# Patient Record
Sex: MALE | Race: WHITE | NOT HISPANIC OR LATINO | URBAN - METROPOLITAN AREA
[De-identification: names, ages, dates, MRNs, and addresses within clinical notes are randomized per-mention and may not be internally consistent; named-entity substitution may affect disease eponyms.]

---
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
## 2023-06-09 NOTE — ED PROCEDURE NOTE - CPROC ED POST PROC CARE GUIDE1
Verbal/written post procedure instructions were given to patient/caregiver./Instructed patient/caregiver to follow-up with primary care physician./Instructed patient/caregiver regarding signs and symptoms of infection./Keep the cast/splint/dressing clean and dry.
minutes on the discharge service.

## 2023-07-26 ENCOUNTER — APPOINTMENT (OUTPATIENT)
Dept: TRANSGENDER CARE | Facility: CLINIC | Age: 18
End: 2023-07-26
Payer: COMMERCIAL

## 2023-07-26 ENCOUNTER — APPOINTMENT (OUTPATIENT)
Dept: TRANSGENDER CARE | Facility: CLINIC | Age: 18
End: 2023-07-26

## 2023-07-26 VITALS
SYSTOLIC BLOOD PRESSURE: 118 MMHG | WEIGHT: 215 LBS | HEIGHT: 69.5 IN | BODY MASS INDEX: 31.13 KG/M2 | TEMPERATURE: 98.3 F | DIASTOLIC BLOOD PRESSURE: 70 MMHG | OXYGEN SATURATION: 99 % | HEART RATE: 66 BPM

## 2023-07-26 DIAGNOSIS — Z80.1 FAMILY HISTORY OF MALIGNANT NEOPLASM OF TRACHEA, BRONCHUS AND LUNG: ICD-10-CM

## 2023-07-26 PROCEDURE — 36415 COLL VENOUS BLD VENIPUNCTURE: CPT

## 2023-07-26 PROCEDURE — 99204 OFFICE O/P NEW MOD 45 MIN: CPT | Mod: 25

## 2023-07-26 NOTE — PLAN
[FreeTextEntry1] : \par Gender Dysphoria: Patient will be added to MH clearance wait list and provided with referrals for therapy in regards to clearance. Will order routine blood work and f/u results to patient once made available. GAHT to be started pending normal labs and MH clearance letter. Patient was provided with resources/referrals at time of visit. Advised patient to call with any questions or concerns. Patient verbalized understanding.

## 2023-07-26 NOTE — HISTORY OF PRESENT ILLNESS
[FreeTextEntry1] : establish care [de-identified] : BRIONNA CUTLER is a 17 year old, transfemale seen on 07/26/2023 for initial transgender care program intake visit.\par \par Preferred Pronouns: she/her\par Gender identity: female\par Assigned []male at birth\par Specific Terms for Body Parts: medical terms okay\par Call pt: Brionna\par \par Reason for Appointment: Brionna is a 17 year old AMAB woman (she/her) interested in GAHT. Patient has PCP, not looking to establish primary care.\par \par COVID Screening: 3 COVID shots. No interest in MPX vax.\par \par Presenting Problems or Unmet Needs: Reports no medical concerns or diagnoses. No allergies.\par \par “Goals”: Establish GAHT; obtain MH referrals for clearance; \par \par Transition HX + Present Life: She relays that she saw a trans person in a [employer] ad in the 10th Grade and realized that she is trans. She came out to friends in the 11th Grade and they are very supportive. She told her mother a month later and she is very supportive. She relays that her father seems indifferent. Lives with her mother, father, and 3 siblings and feels safe at home. Food secure and reliable transportation. Patient reports she has socially transitioned for 4 years now, wants to start gaht. Patient reports good support from family and friends. Patient looking into FFS as well. Patient reports she is most interested in starting estradiol and spironolactone pills.\par \par Education | Employment: She works at [employer] 4 days a week and is out; people are supportive. She is majoring in Mechanical Engineering at [employer] doesn’t really talk to many people there but feels safe. Patient reports going into [employer] year, likes it a lot of physics and math. \par \par Mental Health: She reports no MH concerns or diagnoses. Doesn’t take meds or see a therapist. Never been inpatient. Reports no sleeping concerns. No family psych hx. She denies any thoughts of hurting self or anyone else. \par \par Endocrinology, Primary Care, GYN: She has never been on GAHT and would like to start. Never had hormones checked. No known personal or family hx of endo disorders. She plans to continue primary care with her current provider, Dr. Vj España. Patient reports last CPE was over a year ago, patient is looking for just gaht \par \par Coping: She likes to play videogames and take naps. She turns to friends for support.\par \par Feelings of Gender Dysphoria/ Body Dysmorphia: She feels dysphoric about her shoulders and face.\par \par Tattoos/ Piercing: Prof done.\par \par Cigarette, Vaping, Marijuana, Alcohol, and Drug Use: She reports no substance use.\par \par Reproductive Endocrinology: No interest. No interest in biological children\par \par Gender Affirming Surgery: Might be interested in the future.\par \par Name Change + Gender Marker: Interested in gender marker change; [place].\par \par Nutrition: She reports no nutritional concerns. 5’9, 210 lbs. 2-3 meals. She ice skates and swims on weekends.\par \par Sexual Health: She is not in a relationship and has never been sexually active. Reports no STI/HIV concerns. Declines any STI testing \par \par Goals of Trans care:\par \par 1) start GAHT\par 2) resources/referrals\par 3)\par \par She reports she has been relatively healthy over the years. She reports several years ago they thought she might have pseudotumor cerebri but turned out to be optic disc swelling as per patient. No further workup or treatment was needed, no issues since then. Pt denies any SOB, cough, chest pain, palpitations, N/V/D/C, fever, or chills. No other health concerns today.

## 2023-07-26 NOTE — HEALTH RISK ASSESSMENT
[1 or 2 (0 pts)] : 1 or 2 (0 points) [Never (0 pts)] : Never (0 points) [No] : In the past 12 months have you used drugs other than those required for medical reasons? No [0] : 2) Feeling down, depressed, or hopeless: Not at all (0) [PHQ-2 Negative - No further assessment needed] : PHQ-2 Negative - No further assessment needed [HIV test declined] : HIV test declined [Hepatitis C test declined] : Hepatitis C test declined [With Family] : lives with family [Employed] : employed [Student] : student [College] : College [Single] : single [Feels Safe at Home] : Feels safe at home [Fully functional (bathing, dressing, toileting, transferring, walking, feeding)] : Fully functional (bathing, dressing, toileting, transferring, walking, feeding) [Fully functional (using the telephone, shopping, preparing meals, housekeeping, doing laundry, using] : Fully functional and needs no help or supervision to perform IADLs (using the telephone, shopping, preparing meals, housekeeping, doing laundry, using transportation, managing medications and managing finances) [Reports normal functional visual acuity (ie: able to read med bottle)] : Reports normal functional visual acuity [Never] : Never [Audit-CScore] : 0 [de-identified] : ice skates, swims [de-identified] : regular [[id number]] : 0 [Sexually Active] : not sexually active [High Risk Behavior] : no high risk behavior [Reports changes in hearing] : Reports no changes in hearing [Reports changes in vision] : Reports no changes in vision [Reports changes in dental health] : Reports no changes in dental health [FreeTextEntry2] : working at [employer] [de-identified] : studying mechanical engineering at [employer]

## 2023-07-28 ENCOUNTER — NON-APPOINTMENT (OUTPATIENT)
Age: 18
End: 2023-07-28

## 2023-07-28 LAB
ALBUMIN SERPL ELPH-MCNC: 4.9 G/DL
ALP BLD-CCNC: 83 U/L
ALT SERPL-CCNC: 37 U/L
ANION GAP SERPL CALC-SCNC: 11 MMOL/L
AST SERPL-CCNC: 21 U/L
BILIRUB SERPL-MCNC: 0.4 MG/DL
BUN SERPL-MCNC: 14 MG/DL
CALCIUM SERPL-MCNC: 9.5 MG/DL
CHLORIDE SERPL-SCNC: 106 MMOL/L
CHOLEST SERPL-MCNC: 190 MG/DL
CO2 SERPL-SCNC: 24 MMOL/L
CREAT SERPL-MCNC: 0.76 MG/DL
EGFR: 133 ML/MIN/1.73M2
ESTIMATED AVERAGE GLUCOSE: 94 MG/DL
ESTRADIOL SERPL-MCNC: 35 PG/ML
GLUCOSE SERPL-MCNC: 85 MG/DL
HBA1C MFR BLD HPLC: 4.9 %
HDLC SERPL-MCNC: 38 MG/DL
LDLC SERPL CALC-MCNC: 128 MG/DL
NONHDLC SERPL-MCNC: 152 MG/DL
POTASSIUM SERPL-SCNC: 4.8 MMOL/L
PROT SERPL-MCNC: 7.1 G/DL
SODIUM SERPL-SCNC: 141 MMOL/L
TESTOST SERPL-MCNC: 481 NG/DL
TRIGL SERPL-MCNC: 132 MG/DL
TSH SERPL-ACNC: 1.56 UIU/ML

## 2023-09-21 ENCOUNTER — APPOINTMENT (OUTPATIENT)
Dept: TRANSGENDER CARE | Facility: CLINIC | Age: 18
End: 2023-09-21
Payer: COMMERCIAL

## 2023-09-21 PROCEDURE — 90832 PSYTX W PT 30 MINUTES: CPT | Mod: 95

## 2023-09-28 ENCOUNTER — APPOINTMENT (OUTPATIENT)
Dept: TRANSGENDER CARE | Facility: CLINIC | Age: 18
End: 2023-09-28
Payer: COMMERCIAL

## 2023-09-28 ENCOUNTER — TRANSCRIPTION ENCOUNTER (OUTPATIENT)
Age: 18
End: 2023-09-28

## 2023-09-28 PROCEDURE — 99213 OFFICE O/P EST LOW 20 MIN: CPT | Mod: 95

## 2023-10-02 ENCOUNTER — NON-APPOINTMENT (OUTPATIENT)
Age: 18
End: 2023-10-02

## 2023-11-13 ENCOUNTER — NON-APPOINTMENT (OUTPATIENT)
Age: 18
End: 2023-11-13

## 2023-12-21 ENCOUNTER — APPOINTMENT (OUTPATIENT)
Dept: TRANSGENDER CARE | Facility: CLINIC | Age: 18
End: 2023-12-21
Payer: COMMERCIAL

## 2023-12-21 VITALS
SYSTOLIC BLOOD PRESSURE: 102 MMHG | TEMPERATURE: 97.9 F | OXYGEN SATURATION: 98 % | BODY MASS INDEX: 30.55 KG/M2 | WEIGHT: 211 LBS | DIASTOLIC BLOOD PRESSURE: 68 MMHG | HEART RATE: 77 BPM | HEIGHT: 69.5 IN

## 2023-12-21 DIAGNOSIS — Z23 ENCOUNTER FOR IMMUNIZATION: ICD-10-CM

## 2023-12-21 PROCEDURE — 99213 OFFICE O/P EST LOW 20 MIN: CPT | Mod: 25

## 2023-12-21 PROCEDURE — 36415 COLL VENOUS BLD VENIPUNCTURE: CPT

## 2023-12-21 NOTE — HISTORY OF PRESENT ILLNESS
[FreeTextEntry1] : f/u [de-identified] : Patient here today for gaht f/u.  She reports feeling good on gaht, softer skin, some breast growth, more emotional and some increased urinary frequency. She denies any side effects, just some nipple sensitivity. She reports okay so far on dosing, she has enough pills through tuesday.  Social Hx: She works at [employer] 4 days a week. 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 Patient reports in [employer] year, likes it a lot of physics and math. She is officially on winter break, going back mid jan. She is working a lot during break   Patient denies any headache visual changes, dizziness, chest pain, SOB, palpitations, wheezing, cough, abdominal pain, nausea, vomiting, diarrhea, joint pain, leg swelling or leg pain. No other health concerns today.

## 2023-12-21 NOTE — PLAN
[FreeTextEntry1] :  Gender Dysphoria: stable. Will order routine blood work and f/u results to patient once made available - will send [facility] message with results and dose adjustment if needed. F/u 3 months. Advised patient to call with any questions or concerns. Patient verbalized understanding.

## 2023-12-22 ENCOUNTER — TRANSCRIPTION ENCOUNTER (OUTPATIENT)
Age: 18
End: 2023-12-22

## 2023-12-22 LAB
ALBUMIN SERPL ELPH-MCNC: 4.6 G/DL
ALP BLD-CCNC: 75 U/L
ALT SERPL-CCNC: 28 U/L
ANION GAP SERPL CALC-SCNC: 13 MMOL/L
AST SERPL-CCNC: 16 U/L
BASOPHILS # BLD AUTO: 0.03 K/UL
BASOPHILS NFR BLD AUTO: 0.4 %
BILIRUB SERPL-MCNC: 0.5 MG/DL
BUN SERPL-MCNC: 11 MG/DL
CALCIUM SERPL-MCNC: 9.5 MG/DL
CHLORIDE SERPL-SCNC: 103 MMOL/L
CO2 SERPL-SCNC: 21 MMOL/L
CREAT SERPL-MCNC: 0.63 MG/DL
EGFR: 140 ML/MIN/1.73M2
EOSINOPHIL # BLD AUTO: 0.14 K/UL
EOSINOPHIL NFR BLD AUTO: 2 %
ESTRADIOL SERPL-MCNC: 85 PG/ML
GLUCOSE SERPL-MCNC: 74 MG/DL
HCT VFR BLD CALC: 46.5 %
HGB BLD-MCNC: 15.7 G/DL
IMM GRANULOCYTES NFR BLD AUTO: 0.3 %
LYMPHOCYTES # BLD AUTO: 2.01 K/UL
LYMPHOCYTES NFR BLD AUTO: 28.2 %
MAN DIFF?: NORMAL
MCHC RBC-ENTMCNC: 30.7 PG
MCHC RBC-ENTMCNC: 33.8 GM/DL
MCV RBC AUTO: 91 FL
MONOCYTES # BLD AUTO: 0.46 K/UL
MONOCYTES NFR BLD AUTO: 6.5 %
NEUTROPHILS # BLD AUTO: 4.47 K/UL
NEUTROPHILS NFR BLD AUTO: 62.6 %
PLATELET # BLD AUTO: 288 K/UL
POTASSIUM SERPL-SCNC: 4.7 MMOL/L
PROT SERPL-MCNC: 7.1 G/DL
RBC # BLD: 5.11 M/UL
RBC # FLD: 12.2 %
SODIUM SERPL-SCNC: 137 MMOL/L
TESTOST SERPL-MCNC: 227 NG/DL
WBC # FLD AUTO: 7.13 K/UL

## 2024-02-17 ENCOUNTER — RX RENEWAL (OUTPATIENT)
Age: 19
End: 2024-02-17

## 2024-03-21 ENCOUNTER — APPOINTMENT (OUTPATIENT)
Dept: TRANSGENDER CARE | Facility: CLINIC | Age: 19
End: 2024-03-21
Payer: COMMERCIAL

## 2024-03-21 VITALS
DIASTOLIC BLOOD PRESSURE: 68 MMHG | BODY MASS INDEX: 30.81 KG/M2 | OXYGEN SATURATION: 98 % | TEMPERATURE: 97.8 F | HEART RATE: 64 BPM | HEIGHT: 69 IN | SYSTOLIC BLOOD PRESSURE: 108 MMHG | WEIGHT: 208 LBS

## 2024-03-21 DIAGNOSIS — F64.9 GENDER IDENTITY DISORDER, UNSPECIFIED: ICD-10-CM

## 2024-03-21 DIAGNOSIS — Z00.00 ENCOUNTER FOR GENERAL ADULT MEDICAL EXAMINATION W/OUT ABNORMAL FINDINGS: ICD-10-CM

## 2024-03-21 PROCEDURE — 36415 COLL VENOUS BLD VENIPUNCTURE: CPT

## 2024-03-21 PROCEDURE — G2211 COMPLEX E/M VISIT ADD ON: CPT

## 2024-03-21 PROCEDURE — 99213 OFFICE O/P EST LOW 20 MIN: CPT

## 2024-03-21 NOTE — PLAN
[FreeTextEntry1] :  Gender Dysphoria: stable. Will order routine blood work and f/u results to patient once made available - will send [facility] message with results and dose adjustment if needed. F/u 3 months. Advised patient to call with any questions or concerns. Patient verbalized understanding.

## 2024-03-21 NOTE — HISTORY OF PRESENT ILLNESS
[FreeTextEntry1] : f/u [de-identified] : Patient here today for gaht f/u.  Gender Dysphoria: Patient started gaht 9/2023. She reports feeling good on gaht, she reports more of the same changes just progressing along. She feels good on current regimen  Social Hx: She works at [employer] 4 days a week. 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 Patient had spring break last week, she has 7 more weeks until the semester ends. No big plans for the summer  Patient denies any headache visual changes, dizziness, chest pain, SOB, palpitations, wheezing, cough, abdominal pain, nausea, vomiting, diarrhea, joint pain, leg swelling or leg pain. No other health concerns today.

## 2024-03-22 ENCOUNTER — TRANSCRIPTION ENCOUNTER (OUTPATIENT)
Age: 19
End: 2024-03-22

## 2024-03-22 DIAGNOSIS — E55.9 VITAMIN D DEFICIENCY, UNSPECIFIED: ICD-10-CM

## 2024-03-22 LAB
25(OH)D3 SERPL-MCNC: 12.9 NG/ML
ALBUMIN SERPL ELPH-MCNC: 4.5 G/DL
ALP BLD-CCNC: 62 U/L
ALT SERPL-CCNC: 24 U/L
ANION GAP SERPL CALC-SCNC: 10 MMOL/L
AST SERPL-CCNC: 16 U/L
BASOPHILS # BLD AUTO: 0.03 K/UL
BASOPHILS NFR BLD AUTO: 0.5 %
BILIRUB SERPL-MCNC: 0.4 MG/DL
BUN SERPL-MCNC: 12 MG/DL
CALCIUM SERPL-MCNC: 9.7 MG/DL
CHLORIDE SERPL-SCNC: 104 MMOL/L
CHOLEST SERPL-MCNC: 165 MG/DL
CO2 SERPL-SCNC: 24 MMOL/L
CREAT SERPL-MCNC: 0.7 MG/DL
EGFR: 135 ML/MIN/1.73M2
EOSINOPHIL # BLD AUTO: 0.09 K/UL
EOSINOPHIL NFR BLD AUTO: 1.4 %
ESTIMATED AVERAGE GLUCOSE: 91 MG/DL
ESTRADIOL SERPL-MCNC: 79 PG/ML
GLUCOSE SERPL-MCNC: 82 MG/DL
HBA1C MFR BLD HPLC: 4.8 %
HCT VFR BLD CALC: 45.1 %
HDLC SERPL-MCNC: 49 MG/DL
HGB BLD-MCNC: 14.9 G/DL
IMM GRANULOCYTES NFR BLD AUTO: 0.2 %
LDLC SERPL CALC-MCNC: 101 MG/DL
LYMPHOCYTES # BLD AUTO: 1.72 K/UL
LYMPHOCYTES NFR BLD AUTO: 25.9 %
MAN DIFF?: NORMAL
MCHC RBC-ENTMCNC: 30.5 PG
MCHC RBC-ENTMCNC: 33 GM/DL
MCV RBC AUTO: 92.2 FL
MONOCYTES # BLD AUTO: 0.39 K/UL
MONOCYTES NFR BLD AUTO: 5.9 %
NEUTROPHILS # BLD AUTO: 4.41 K/UL
NEUTROPHILS NFR BLD AUTO: 66.1 %
NONHDLC SERPL-MCNC: 116 MG/DL
PLATELET # BLD AUTO: 310 K/UL
POTASSIUM SERPL-SCNC: 4.8 MMOL/L
PROT SERPL-MCNC: 6.9 G/DL
RBC # BLD: 4.89 M/UL
RBC # FLD: 11.9 %
SODIUM SERPL-SCNC: 137 MMOL/L
TESTOST SERPL-MCNC: 158 NG/DL
TRIGL SERPL-MCNC: 78 MG/DL
TSH SERPL-ACNC: 1.46 UIU/ML
WBC # FLD AUTO: 6.65 K/UL

## 2024-03-27 RX ORDER — SPIRONOLACTONE 100 MG/1
100 TABLET ORAL
Qty: 180 | Refills: 0 | Status: ACTIVE | COMMUNITY
Start: 2023-09-28 | End: 1900-01-01

## 2024-05-10 ENCOUNTER — RX RENEWAL (OUTPATIENT)
Age: 19
End: 2024-05-10

## 2024-05-10 RX ORDER — ERGOCALCIFEROL 1.25 MG/1
1.25 MG CAPSULE, LIQUID FILLED ORAL
Qty: 8 | Refills: 0 | Status: ACTIVE | COMMUNITY
Start: 2024-03-22 | End: 1900-01-01

## 2024-05-16 ENCOUNTER — RX RENEWAL (OUTPATIENT)
Age: 19
End: 2024-05-16

## 2024-05-16 RX ORDER — ESTRADIOL 2 MG/1
2 TABLET ORAL
Qty: 180 | Refills: 0 | Status: ACTIVE | COMMUNITY
Start: 2023-09-28 | End: 1900-01-01

## 2024-06-20 ENCOUNTER — APPOINTMENT (OUTPATIENT)
Dept: TRANSGENDER CARE | Facility: CLINIC | Age: 19
End: 2024-06-20

## 2024-06-24 ENCOUNTER — APPOINTMENT (OUTPATIENT)
Dept: TRANSGENDER CARE | Facility: CLINIC | Age: 19
End: 2024-06-24

## 2024-07-03 ENCOUNTER — APPOINTMENT (OUTPATIENT)
Dept: TRANSGENDER CARE | Facility: CLINIC | Age: 19
End: 2024-07-03
Payer: COMMERCIAL

## 2024-07-03 VITALS
SYSTOLIC BLOOD PRESSURE: 106 MMHG | TEMPERATURE: 98.5 F | WEIGHT: 204 LBS | HEART RATE: 76 BPM | OXYGEN SATURATION: 98 % | BODY MASS INDEX: 30.21 KG/M2 | DIASTOLIC BLOOD PRESSURE: 68 MMHG | HEIGHT: 69 IN

## 2024-07-03 DIAGNOSIS — F64.9 GENDER IDENTITY DISORDER, UNSPECIFIED: ICD-10-CM

## 2024-07-03 PROCEDURE — G2211 COMPLEX E/M VISIT ADD ON: CPT

## 2024-07-03 PROCEDURE — 99213 OFFICE O/P EST LOW 20 MIN: CPT

## 2024-07-03 PROCEDURE — 36415 COLL VENOUS BLD VENIPUNCTURE: CPT

## 2024-07-04 ENCOUNTER — TRANSCRIPTION ENCOUNTER (OUTPATIENT)
Age: 19
End: 2024-07-04

## 2024-07-04 LAB
25(OH)D3 SERPL-MCNC: 30 NG/ML
ALBUMIN SERPL ELPH-MCNC: 4.6 G/DL
ALP BLD-CCNC: 50 U/L
ALT SERPL-CCNC: 22 U/L
ANION GAP SERPL CALC-SCNC: 11 MMOL/L
AST SERPL-CCNC: 17 U/L
BASOPHILS # BLD AUTO: 0.04 K/UL
BASOPHILS NFR BLD AUTO: 0.4 %
BILIRUB SERPL-MCNC: 0.4 MG/DL
BUN SERPL-MCNC: 9 MG/DL
CALCIUM SERPL-MCNC: 9.5 MG/DL
CHLORIDE SERPL-SCNC: 105 MMOL/L
CO2 SERPL-SCNC: 22 MMOL/L
CREAT SERPL-MCNC: 0.65 MG/DL
EGFR: 138 ML/MIN/1.73M2
EOSINOPHIL # BLD AUTO: 0.12 K/UL
EOSINOPHIL NFR BLD AUTO: 1.3 %
ESTRADIOL SERPL-MCNC: 113 PG/ML
GLUCOSE SERPL-MCNC: 86 MG/DL
HCT VFR BLD CALC: 43.7 %
HGB BLD-MCNC: 14.5 G/DL
IMM GRANULOCYTES NFR BLD AUTO: 0.2 %
LYMPHOCYTES # BLD AUTO: 2.44 K/UL
LYMPHOCYTES NFR BLD AUTO: 26 %
MAN DIFF?: NORMAL
MCHC RBC-ENTMCNC: 30.2 PG
MCHC RBC-ENTMCNC: 33.2 GM/DL
MCV RBC AUTO: 91 FL
MONOCYTES # BLD AUTO: 0.56 K/UL
MONOCYTES NFR BLD AUTO: 6 %
NEUTROPHILS # BLD AUTO: 6.2 K/UL
NEUTROPHILS NFR BLD AUTO: 66.1 %
PLATELET # BLD AUTO: 332 K/UL
POTASSIUM SERPL-SCNC: 4.3 MMOL/L
PROT SERPL-MCNC: 6.9 G/DL
RBC # BLD: 4.8 M/UL
RBC # FLD: 12.3 %
SODIUM SERPL-SCNC: 139 MMOL/L
TESTOST SERPL-MCNC: 96.6 NG/DL
WBC # FLD AUTO: 9.38 K/UL

## 2024-07-05 ENCOUNTER — TRANSCRIPTION ENCOUNTER (OUTPATIENT)
Age: 19
End: 2024-07-05

## 2024-07-05 RX ORDER — SPIRONOLACTONE 50 MG/1
50 TABLET ORAL DAILY
Qty: 90 | Refills: 1 | Status: ACTIVE | COMMUNITY
Start: 2024-07-05 | End: 1900-01-01

## 2024-08-05 ENCOUNTER — NON-APPOINTMENT (OUTPATIENT)
Age: 19
End: 2024-08-05

## 2024-08-13 ENCOUNTER — APPOINTMENT (OUTPATIENT)
Dept: DERMATOLOGY | Facility: CLINIC | Age: 19
End: 2024-08-13
Payer: COMMERCIAL

## 2024-08-13 DIAGNOSIS — L02.415 CUTANEOUS ABSCESS OF RIGHT LOWER LIMB: ICD-10-CM

## 2024-08-13 PROCEDURE — 99214 OFFICE O/P EST MOD 30 MIN: CPT

## 2024-08-13 RX ORDER — CEFADROXIL 500 MG/1
500 CAPSULE ORAL TWICE DAILY
Qty: 20 | Refills: 0 | Status: ACTIVE | COMMUNITY
Start: 2024-08-13 | End: 1900-01-01

## 2024-08-13 RX ORDER — MUPIROCIN 20 MG/G
2 OINTMENT TOPICAL
Qty: 1 | Refills: 3 | Status: ACTIVE | COMMUNITY
Start: 2024-08-13 | End: 1900-01-01

## 2024-08-13 NOTE — ASSESSMENT
[FreeTextEntry1] : Ingrown toenail with probable superinfection   Therapeutic options and their risks and benefits; along with multiple diagnostic possibilities were discussed at length; risks and benefits of further study were discussed;  duricef 500 BID x 10d mupirocin ointment BID  recommend consult with Dr. Herbert- podiatry for definitive treatment;  check portal 3d for culture results

## 2024-08-13 NOTE — PHYSICAL EXAM
[FreeTextEntry3] : Right great toe: + ingrowth of lateral nailfolds, with hypertrophy, erythema, tender, with purulent drainage b/l

## 2024-08-13 NOTE — HISTORY OF PRESENT ILLNESS
[de-identified] : The patient has been fit in for urgent appointment.  Pt. c/o problems with right great toenail;  has been painful, persistent, has been treated for ingrowth in the past

## 2024-08-20 ENCOUNTER — TRANSCRIPTION ENCOUNTER (OUTPATIENT)
Age: 19
End: 2024-08-20

## 2024-08-20 LAB — BACTERIA SPEC CULT: ABNORMAL

## 2024-09-27 ENCOUNTER — RX RENEWAL (OUTPATIENT)
Age: 19
End: 2024-09-27

## 2024-10-14 ENCOUNTER — NON-APPOINTMENT (OUTPATIENT)
Age: 19
End: 2024-10-14

## 2024-10-21 ENCOUNTER — NON-APPOINTMENT (OUTPATIENT)
Age: 19
End: 2024-10-21

## 2024-10-21 ENCOUNTER — APPOINTMENT (OUTPATIENT)
Dept: TRANSGENDER CARE | Facility: CLINIC | Age: 19
End: 2024-10-21
Payer: COMMERCIAL

## 2024-10-21 VITALS
HEART RATE: 74 BPM | SYSTOLIC BLOOD PRESSURE: 114 MMHG | HEIGHT: 69 IN | WEIGHT: 224 LBS | OXYGEN SATURATION: 98 % | DIASTOLIC BLOOD PRESSURE: 70 MMHG | TEMPERATURE: 98.3 F | BODY MASS INDEX: 33.18 KG/M2

## 2024-10-21 DIAGNOSIS — F64.9 GENDER IDENTITY DISORDER, UNSPECIFIED: ICD-10-CM

## 2024-10-21 DIAGNOSIS — Z23 ENCOUNTER FOR IMMUNIZATION: ICD-10-CM

## 2024-10-21 DIAGNOSIS — F90.9 ATTENTION-DEFICIT HYPERACTIVITY DISORDER, UNSPECIFIED TYPE: ICD-10-CM

## 2024-10-21 PROCEDURE — 93000 ELECTROCARDIOGRAM COMPLETE: CPT

## 2024-10-21 PROCEDURE — G0008: CPT

## 2024-10-21 PROCEDURE — 90656 IIV3 VACC NO PRSV 0.5 ML IM: CPT

## 2024-10-22 ENCOUNTER — TRANSCRIPTION ENCOUNTER (OUTPATIENT)
Age: 19
End: 2024-10-22

## 2024-10-22 LAB
ALBUMIN SERPL ELPH-MCNC: 4.4 G/DL
ALP BLD-CCNC: 51 U/L
ALT SERPL-CCNC: 19 U/L
ANION GAP SERPL CALC-SCNC: 13 MMOL/L
AST SERPL-CCNC: 16 U/L
BASOPHILS # BLD AUTO: 0.06 K/UL
BASOPHILS NFR BLD AUTO: 0.5 %
BILIRUB SERPL-MCNC: 0.2 MG/DL
BUN SERPL-MCNC: 13 MG/DL
CALCIUM SERPL-MCNC: 9.7 MG/DL
CHLORIDE SERPL-SCNC: 103 MMOL/L
CO2 SERPL-SCNC: 23 MMOL/L
CREAT SERPL-MCNC: 0.69 MG/DL
EGFR: 135 ML/MIN/1.73M2
EOSINOPHIL # BLD AUTO: 0.38 K/UL
EOSINOPHIL NFR BLD AUTO: 3.3 %
ESTRADIOL SERPL-MCNC: 133 PG/ML
GLUCOSE SERPL-MCNC: 73 MG/DL
HCT VFR BLD CALC: 42.8 %
HGB BLD-MCNC: 14.7 G/DL
IMM GRANULOCYTES NFR BLD AUTO: 0.3 %
LYMPHOCYTES # BLD AUTO: 2.69 K/UL
LYMPHOCYTES NFR BLD AUTO: 23.6 %
MAN DIFF?: NORMAL
MCHC RBC-ENTMCNC: 31.3 PG
MCHC RBC-ENTMCNC: 34.3 GM/DL
MCV RBC AUTO: 91.1 FL
MONOCYTES # BLD AUTO: 0.91 K/UL
MONOCYTES NFR BLD AUTO: 8 %
NEUTROPHILS # BLD AUTO: 7.33 K/UL
NEUTROPHILS NFR BLD AUTO: 64.3 %
PLATELET # BLD AUTO: 330 K/UL
POTASSIUM SERPL-SCNC: 5.1 MMOL/L
PROT SERPL-MCNC: 6.9 G/DL
RBC # BLD: 4.7 M/UL
RBC # FLD: 11.8 %
SODIUM SERPL-SCNC: 138 MMOL/L
TESTOST SERPL-MCNC: 3.8 NG/DL
WBC # FLD AUTO: 11.4 K/UL

## 2025-01-03 ENCOUNTER — RX RENEWAL (OUTPATIENT)
Age: 20
End: 2025-01-03

## 2025-04-21 ENCOUNTER — APPOINTMENT (OUTPATIENT)
Dept: TRANSGENDER CARE | Facility: CLINIC | Age: 20
End: 2025-04-21
Payer: COMMERCIAL

## 2025-04-21 VITALS
WEIGHT: 240 LBS | BODY MASS INDEX: 35.55 KG/M2 | OXYGEN SATURATION: 98 % | HEART RATE: 74 BPM | SYSTOLIC BLOOD PRESSURE: 110 MMHG | HEIGHT: 69 IN | DIASTOLIC BLOOD PRESSURE: 72 MMHG | TEMPERATURE: 98.1 F

## 2025-04-21 DIAGNOSIS — F64.9 GENDER IDENTITY DISORDER, UNSPECIFIED: ICD-10-CM

## 2025-04-21 DIAGNOSIS — F90.9 ATTENTION-DEFICIT HYPERACTIVITY DISORDER, UNSPECIFIED TYPE: ICD-10-CM

## 2025-04-21 PROCEDURE — 36415 COLL VENOUS BLD VENIPUNCTURE: CPT

## 2025-04-21 PROCEDURE — 99214 OFFICE O/P EST MOD 30 MIN: CPT

## 2025-04-21 PROCEDURE — G2211 COMPLEX E/M VISIT ADD ON: CPT

## 2025-04-22 ENCOUNTER — TRANSCRIPTION ENCOUNTER (OUTPATIENT)
Age: 20
End: 2025-04-22

## 2025-04-22 LAB
25(OH)D3 SERPL-MCNC: 13.4 NG/ML
ALBUMIN SERPL ELPH-MCNC: 4.3 G/DL
ALP BLD-CCNC: 51 U/L
ALT SERPL-CCNC: 23 U/L
ANION GAP SERPL CALC-SCNC: 13 MMOL/L
AST SERPL-CCNC: 17 U/L
BASOPHILS # BLD AUTO: 0.04 K/UL
BASOPHILS NFR BLD AUTO: 0.4 %
BILIRUB SERPL-MCNC: 0.3 MG/DL
BUN SERPL-MCNC: 12 MG/DL
CALCIUM SERPL-MCNC: 9.7 MG/DL
CHLORIDE SERPL-SCNC: 103 MMOL/L
CHOLEST SERPL-MCNC: 175 MG/DL
CO2 SERPL-SCNC: 21 MMOL/L
CREAT SERPL-MCNC: 0.66 MG/DL
EGFRCR SERPLBLD CKD-EPI 2021: 137 ML/MIN/1.73M2
EOSINOPHIL # BLD AUTO: 0.31 K/UL
EOSINOPHIL NFR BLD AUTO: 3.2 %
ESTIMATED AVERAGE GLUCOSE: 94 MG/DL
ESTRADIOL SERPL-MCNC: 116 PG/ML
GLUCOSE SERPL-MCNC: 79 MG/DL
HBA1C MFR BLD HPLC: 4.9 %
HCT VFR BLD CALC: 43.8 %
HDLC SERPL-MCNC: 40 MG/DL
HGB BLD-MCNC: 14.8 G/DL
IMM GRANULOCYTES NFR BLD AUTO: 0.2 %
LDLC SERPL-MCNC: 99 MG/DL
LYMPHOCYTES # BLD AUTO: 2.69 K/UL
LYMPHOCYTES NFR BLD AUTO: 27.8 %
MAN DIFF?: NORMAL
MCHC RBC-ENTMCNC: 30.8 PG
MCHC RBC-ENTMCNC: 33.8 G/DL
MCV RBC AUTO: 91.3 FL
MONOCYTES # BLD AUTO: 0.7 K/UL
MONOCYTES NFR BLD AUTO: 7.2 %
NEUTROPHILS # BLD AUTO: 5.93 K/UL
NEUTROPHILS NFR BLD AUTO: 61.2 %
NONHDLC SERPL-MCNC: 135 MG/DL
PLATELET # BLD AUTO: 357 K/UL
POTASSIUM SERPL-SCNC: 4.7 MMOL/L
PROT SERPL-MCNC: 6.9 G/DL
RBC # BLD: 4.8 M/UL
RBC # FLD: 11.9 %
SODIUM SERPL-SCNC: 136 MMOL/L
TESTOST SERPL-MCNC: 10.6 NG/DL
TRIGL SERPL-MCNC: 210 MG/DL
TSH SERPL-ACNC: 2 UIU/ML
WBC # FLD AUTO: 9.69 K/UL

## 2025-04-22 RX ORDER — ERGOCALCIFEROL 1.25 MG/1
1.25 MG CAPSULE, LIQUID FILLED ORAL
Qty: 8 | Refills: 0 | Status: ACTIVE | COMMUNITY
Start: 2025-04-22 | End: 1900-01-01